# Patient Record
Sex: FEMALE | Race: WHITE | URBAN - METROPOLITAN AREA
[De-identification: names, ages, dates, MRNs, and addresses within clinical notes are randomized per-mention and may not be internally consistent; named-entity substitution may affect disease eponyms.]

---

## 2021-05-09 PROBLEM — 429087003: Status: ACTIVE | Noted: 2021-05-09

## 2021-05-10 ENCOUNTER — LAB OUTSIDE AN ENCOUNTER (OUTPATIENT)
Dept: URBAN - METROPOLITAN AREA CLINIC 124 | Facility: CLINIC | Age: 81
End: 2021-05-10

## 2021-05-10 ENCOUNTER — OFFICE VISIT (OUTPATIENT)
Dept: URBAN - METROPOLITAN AREA CLINIC 124 | Facility: CLINIC | Age: 81
End: 2021-05-10
Payer: MEDICARE

## 2021-05-10 ENCOUNTER — OFFICE VISIT (OUTPATIENT)
Dept: URBAN - METROPOLITAN AREA CLINIC 124 | Facility: CLINIC | Age: 81
End: 2021-05-10

## 2021-05-10 ENCOUNTER — TELEPHONE ENCOUNTER (OUTPATIENT)
Dept: URBAN - METROPOLITAN AREA CLINIC 92 | Facility: CLINIC | Age: 81
End: 2021-05-10

## 2021-05-10 VITALS
WEIGHT: 168.6 LBS | DIASTOLIC BLOOD PRESSURE: 84 MMHG | SYSTOLIC BLOOD PRESSURE: 132 MMHG | HEIGHT: 69 IN | TEMPERATURE: 98.2 F | BODY MASS INDEX: 24.97 KG/M2

## 2021-05-10 DIAGNOSIS — K44.9 HIATAL HERNIA: ICD-10-CM

## 2021-05-10 DIAGNOSIS — K22.8 DILATATION OF ESOPHAGUS: ICD-10-CM

## 2021-05-10 DIAGNOSIS — J47.9 BRONCHIECTASIS WITHOUT COMPLICATION: ICD-10-CM

## 2021-05-10 DIAGNOSIS — K21.9 GASTROESOPHAGEAL REFLUX DISEASE, UNSPECIFIED WHETHER ESOPHAGITIS PRESENT: ICD-10-CM

## 2021-05-10 DIAGNOSIS — Z85.3 PERSONAL HISTORY OF BREAST CANCER: ICD-10-CM

## 2021-05-10 PROBLEM — 429087003: Status: ACTIVE | Noted: 2021-05-10

## 2021-05-10 PROBLEM — 12295008: Status: ACTIVE | Noted: 2021-05-10

## 2021-05-10 PROCEDURE — 99204 OFFICE O/P NEW MOD 45 MIN: CPT | Performed by: INTERNAL MEDICINE

## 2021-05-10 RX ORDER — ESOMEPRAZOLE MAGNESIUM 20 MG
CAPSULE,DELAYED RELEASE (ENTERIC COATED) ORAL
Qty: 0 | Refills: 0 | Status: ACTIVE | COMMUNITY
Start: 1900-01-01

## 2021-05-10 RX ORDER — ESOMEPRAZOLE MAGNESIUM 20 MG
CAPSULE,DELAYED RELEASE (ENTERIC COATED) ORAL
Qty: 0 | Refills: 0 | COMMUNITY
Start: 1900-01-01

## 2021-05-10 NOTE — HPI-TODAY'S VISIT:
This is a 80-year-old female referred by Dr. Grubbs for a GI consultation of a hernia and a copy of this note will be sent to the referring physician.  Patient did see Dr. Rosado back in 2016 for second opinion regarding abdominal pain, belching, bloating and a bad taste in her mouth.  Dr. Corbin ordered an ultrasound of the abdomen and HIDA scan of which both were negative.  She was started on Dexilant 60 mg a day.  He also ordered an EGD but it does not appear that this was done. 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 Pt had a CT done- 3/23/21- interstitial lung disease with mild fibrotic change in the lungs, moderate dilatation of the thoracic esophagus associated with a fairly large hiatla hernia. Both dilation and hernia larger c/w 2018 scan. Right throid nodule not changed. Pt has lots of bellching and food coming up into her mouth. Pt is on protonix BID for a while. Pt was seeing Dr Jolly for her GI care. Pt last colonoscopy 5 yrs ago- pt had Lots of upper tests 8 yrs ago. Pt does not recal being told about esophagus but had eophageal ulcers. Pt is a retired dental hygenist. Pt has a 40 yo son- had one late in life.

## 2021-05-10 NOTE — HPI-TODAY'S VISIT:
This is a 80-year-old female referred by Dr. Whitney Grubbs for a GI consultation of a hernia and a copy of this note will be sent to the referring physician.  Upon review of the chart, it appears that the patient saw my partner Dr. Rosado back in 2016 for a second opinion for some upper abdominal pain as well as belching and bloating and a bad taste in her mouth.  She had tried Nexium without help.  She denied dysphagia.  Dr. Rosado changed her PPI to Dexilant added Zantac at night and put her on some FD guard and had her try a gastroparesis diet.  He stated that if her symptoms continued he would consider an upper endoscopy but he did order an ultrasound with HIDA scan.  Ultrasound was done on October 27, 2016 and this revealed fatty liver and mild dilatation of the lower pole of the collecting system of the left kidney.  HIDA scan was done on November 3, 2016 and revealed no abnormalities with normal gallbladder contraction.  Dr. Rosado then ordered an EGD but it appears that this was not done.

## 2021-05-18 ENCOUNTER — LAB OUTSIDE AN ENCOUNTER (OUTPATIENT)
Dept: URBAN - METROPOLITAN AREA CLINIC 96 | Facility: CLINIC | Age: 81
End: 2021-05-18

## 2021-05-18 ENCOUNTER — TELEPHONE ENCOUNTER (OUTPATIENT)
Dept: URBAN - METROPOLITAN AREA CLINIC 92 | Facility: CLINIC | Age: 81
End: 2021-05-18

## 2021-05-18 ENCOUNTER — LAB OUTSIDE AN ENCOUNTER (OUTPATIENT)
Dept: URBAN - METROPOLITAN AREA CLINIC 92 | Facility: CLINIC | Age: 81
End: 2021-05-18

## 2021-05-26 ENCOUNTER — OFFICE VISIT (OUTPATIENT)
Dept: URBAN - METROPOLITAN AREA MEDICAL CENTER 28 | Facility: MEDICAL CENTER | Age: 81
End: 2021-05-26
Payer: MEDICARE

## 2021-05-26 DIAGNOSIS — K21.9 ACID REFLUX: ICD-10-CM

## 2021-05-26 PROCEDURE — 91010 ESOPHAGUS MOTILITY STUDY: CPT | Performed by: INTERNAL MEDICINE

## 2021-06-02 ENCOUNTER — OFFICE VISIT (OUTPATIENT)
Dept: URBAN - METROPOLITAN AREA CLINIC 96 | Facility: CLINIC | Age: 81
End: 2021-06-02

## 2021-06-04 ENCOUNTER — OFFICE VISIT (OUTPATIENT)
Dept: URBAN - METROPOLITAN AREA TELEHEALTH 2 | Facility: TELEHEALTH | Age: 81
End: 2021-06-04
Payer: MEDICARE

## 2021-06-04 ENCOUNTER — LAB OUTSIDE AN ENCOUNTER (OUTPATIENT)
Dept: URBAN - METROPOLITAN AREA TELEHEALTH 2 | Facility: TELEHEALTH | Age: 81
End: 2021-06-04

## 2021-06-04 ENCOUNTER — DASHBOARD ENCOUNTERS (OUTPATIENT)
Age: 81
End: 2021-06-04

## 2021-06-04 DIAGNOSIS — K44.9 HIATAL HERNIA: ICD-10-CM

## 2021-06-04 DIAGNOSIS — K21.9 GASTROESOPHAGEAL REFLUX DISEASE, UNSPECIFIED WHETHER ESOPHAGITIS PRESENT: ICD-10-CM

## 2021-06-04 PROBLEM — 235595009 GASTROESOPHAGEAL REFLUX DISEASE: Status: ACTIVE | Noted: 2021-05-20

## 2021-06-04 PROCEDURE — 99204 OFFICE O/P NEW MOD 45 MIN: CPT | Performed by: INTERNAL MEDICINE

## 2021-06-04 RX ORDER — ESOMEPRAZOLE MAGNESIUM 20 MG
CAPSULE,DELAYED RELEASE (ENTERIC COATED) ORAL
Qty: 0 | Refills: 0 | Status: ACTIVE | COMMUNITY
Start: 1900-01-01

## 2021-06-04 NOTE — HPI-TODAY'S VISIT:
This is a 80-year-old female referred by Dr. Whitney Grubbs for a GI f/u of a hernia and a copy of this note will be sent to the referring physician.  Upon review of the chart, it appears that the patient saw my partner Dr. Rosado back in 2016 for a second opinion for some upper abdominal pain as well as belching and bloating and a bad taste in her mouth.  She had tried Nexium without help.  She denied dysphagia.  Dr. Rosado changed her PPI to Dexilant added Zantac at night and put her on some FD guard and had her try a gastroparesis diet.  He stated that if her symptoms continued he would consider an upper endoscopy but he did order an ultrasound with HIDA scan.  Ultrasound was done on October 27, 2016 and this revealed fatty liver and mild dilatation of the lower pole of the collecting system of the left kidney.  HIDA scan was done on November 3, 2016 and revealed no abnormalities with normal gallbladder contraction.  Dr. Rosado then ordered an EGD but it appears that this was not done. Patient had a chest CT scan on March 23, 2021 that revealed moderate dilation of the esophagus that was fluid-filled greater than in 2018 this was associated with a fairly large hiatal hernia there is also interstitial lung disease with mild fibrotic change noted. Patient had an esophageal manometry study done at [facility] on May 26, 2021 and this revealed lower esophageal sphincter pressure of 7.7. The amplitude of the body contractions were low at 33 and there was some reduced vigor but normal peristaltic pattern. There was decrease in liquid bolus up to 30% but viscous bolus transit was normal. 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 Pt in protonix BID and finds meds work but someteimes she has regurgitation and she belches often. Sometimes in middle of the night and she has to walk around. No dysphagia. [place] in [facility] this weekend.

## 2021-06-23 ENCOUNTER — OFFICE VISIT (OUTPATIENT)
Dept: URBAN - METROPOLITAN AREA CLINIC 96 | Facility: CLINIC | Age: 81
End: 2021-06-23

## 2024-05-14 ENCOUNTER — TELEPHONE ENCOUNTER (OUTPATIENT)
Dept: URBAN - METROPOLITAN AREA CLINIC 63 | Facility: CLINIC | Age: 84
End: 2024-05-14